# Patient Record
Sex: MALE | Race: WHITE | Employment: OTHER | URBAN - METROPOLITAN AREA
[De-identification: names, ages, dates, MRNs, and addresses within clinical notes are randomized per-mention and may not be internally consistent; named-entity substitution may affect disease eponyms.]

---

## 2021-07-14 ENCOUNTER — HOSPITAL ENCOUNTER (EMERGENCY)
Facility: CLINIC | Age: 61
Discharge: HOME | End: 2021-07-14
Attending: EMERGENCY MEDICINE | Admitting: EMERGENCY MEDICINE
Payer: COMMERCIAL

## 2021-07-14 VITALS
TEMPERATURE: 98 F | DIASTOLIC BLOOD PRESSURE: 79 MMHG | HEART RATE: 84 BPM | RESPIRATION RATE: 16 BRPM | SYSTOLIC BLOOD PRESSURE: 150 MMHG | HEIGHT: 74 IN | WEIGHT: 310 LBS | BODY MASS INDEX: 39.78 KG/M2 | OXYGEN SATURATION: 95 %

## 2021-07-14 DIAGNOSIS — K64.4 EXTERNAL HEMORRHOIDS: ICD-10-CM

## 2021-07-14 DIAGNOSIS — Z79.01 CHRONIC ANTICOAGULATION: ICD-10-CM

## 2021-07-14 PROCEDURE — 99283 EMERGENCY DEPT VISIT LOW MDM: CPT | Performed by: EMERGENCY MEDICINE

## 2021-07-14 PROCEDURE — 250N000009 HC RX 250: Performed by: EMERGENCY MEDICINE

## 2021-07-14 RX ORDER — TRANEXAMIC ACID 100 MG/ML
500 INJECTION, SOLUTION INTRAVENOUS ONCE
Status: DISCONTINUED | OUTPATIENT
Start: 2021-07-14 | End: 2021-07-15 | Stop reason: HOSPADM

## 2021-07-14 ASSESSMENT — MIFFLIN-ST. JEOR: SCORE: 2280.9

## 2021-07-15 NOTE — DISCHARGE INSTRUCTIONS
Direct pressure for ongoing bleeding, stool softeners as discussed    Recheck for ongoing bleeding

## 2021-07-20 NOTE — ED PROVIDER NOTES
"  History     Chief Complaint   Patient presents with     Rectal Bleeding     after driving he noticed that he was bleeding from the rectum, thinks it was a hemorroid     HPI  Jesus Alberto Power is a 61 year old male who presents secondary to rectal bleeding.  Has hemorrhoid.  Chronic antiplatelet therapy on clopidogrel and aspirin secondary to coronary disease.    Allergies:  Allergies   Allergen Reactions     Simvastatin Other (See Comments)     Muscle Cramping       Problem List:    There are no problems to display for this patient.       Past Medical History:    No past medical history on file.    Past Surgical History:    No past surgical history on file.    Family History:    No family history on file.    Social History:  Marital Status:   [2]  Social History     Tobacco Use     Smoking status: Not on file   Substance Use Topics     Alcohol use: Not on file     Drug use: Not on file        Medications:    No current outpatient medications on file.        Review of Systems  Problem focused review of systems otherwise negative    Physical Exam   BP: (!) 150/79  Pulse: 84  Temp: 98  F (36.7  C)  Resp: 16  Height: 188 cm (6' 2\")  Weight: 140.6 kg (310 lb) (stated)  SpO2: 95 %      Physical Exam  Nontoxic-appearing no respiratory distress alert and oriented  Skin pink warm and dry  Abdomen soft obese nontender  Large external hemorrhoid scant blood oozing, is not significantly inflamed and is without obvious thrombus.  ED Course        Procedures  Oozing external hemorrhoid is packed with gauze soaked in tranexamic acid.  Bleeding controlled.            Critical Care time:  none               No results found for this or any previous visit (from the past 24 hour(s)).    Medications - No data to display    Assessments & Plan (with Medical Decision Making)  Oozing external hemorrhoid, chronic antiplatelet therapy.  Responded well to tranexamic acid and packing.  Follow-up primary care/general surgery return " criteria reviewed     I have reviewed the nursing notes.    I have reviewed the findings, diagnosis, plan and need for follow up with the patient.          There are no discharge medications for this patient.      Final diagnoses:   Chronic anticoagulation   External hemorrhoids       7/14/2021   [facility] EMERGENCY DEPT     Eagle Gomez MD  07/20/21 0814